# Patient Record
(demographics unavailable — no encounter records)

---

## 2024-12-02 NOTE — PHYSICAL EXAM
[Obese (BMI >= 30)] : obese (BMI >= 30) [Sclera] : the sclera and conjunctiva were normal [Hearing Threshold Finger Rub Not [place]] : hearing was normal [Normal Appearance] : the appearance of the neck was normal [No Respiratory Distress] : no respiratory distress [Heart Rate And Rhythm] : heart rate was normal and rhythm regular [Bowel Sounds] : normal bowel sounds [Abdomen Tenderness] : non-tender [No Masses] : no abdominal mass palpated [Abdomen Soft] : soft [] : no hepatosplenomegaly [No Focal Deficits] : no focal deficits [Oriented To Time, Place, And Person] : oriented to person, place, and time

## 2024-12-02 NOTE — HISTORY OF PRESENT ILLNESS
[FreeTextEntry1] : Ms. Rousseau is a 44yo F with PMH of RCC s/p partial nephrectomy, hypothyroidism, obesity, RACHAEL who presents for colorectal screening.  Patient is due for colorectal cancer screening. No family history of colorectal cancer or IBD. No blood in stools. No weight loss. No change in bowel habits or stool consistency. Having regular bowel movement every 1-2 days. No abdominal pain.  No nausea, vomiting, early satiety. No dysphagia or odynophagia. No heartburn or reflux. No melena. Never had a colonoscopy or endoscopy. Reports recent normal labs including CBC and CMP (not available on EMR).  Patient is actively working on weight loss. She is exercising multiple times per week and is working with a nutritionist on losing weight.